# Patient Record
Sex: MALE | Race: WHITE | Employment: OTHER | URBAN - METROPOLITAN AREA
[De-identification: names, ages, dates, MRNs, and addresses within clinical notes are randomized per-mention and may not be internally consistent; named-entity substitution may affect disease eponyms.]

---

## 2018-09-26 ENCOUNTER — OFFICE VISIT (OUTPATIENT)
Dept: DERMATOLOGY CLINIC | Facility: CLINIC | Age: 83
End: 2018-09-26
Payer: MEDICARE

## 2018-09-26 DIAGNOSIS — D23.5 BENIGN NEOPLASM OF SKIN OF TRUNK, EXCEPT SCROTUM: ICD-10-CM

## 2018-09-26 DIAGNOSIS — D23.30 BENIGN NEOPLASM OF SKIN OF FACE: ICD-10-CM

## 2018-09-26 DIAGNOSIS — D23.4 BENIGN NEOPLASM OF SCALP AND SKIN OF NECK: ICD-10-CM

## 2018-09-26 DIAGNOSIS — D23.60 BENIGN NEOPLASM OF SKIN OF UPPER LIMB, INCLUDING SHOULDER, UNSPECIFIED LATERALITY: ICD-10-CM

## 2018-09-26 DIAGNOSIS — L82.1 SEBORRHEIC KERATOSES: Primary | ICD-10-CM

## 2018-09-26 PROCEDURE — G0463 HOSPITAL OUTPT CLINIC VISIT: HCPCS | Performed by: DERMATOLOGY

## 2018-09-26 PROCEDURE — 99213 OFFICE O/P EST LOW 20 MIN: CPT | Performed by: DERMATOLOGY

## 2018-10-07 NOTE — PROGRESS NOTES
Oralia Nunn is a 81year old male. HPI:     CC:  Patient presents with:  Lesion: LOV 9-14-16. Pt here for upper body scan. He wants KMT to check lesions at R neck, R forehead, forearms. He also c/o re-growth of lesion R index finger removed prev. Years of education: Not on file      Highest education level: Not on file    Social Needs      Financial resource strain: Not on file      Food insecurity - worry: Not on file      Food insecurity - inability: Not on file      Transportation needs - medica chest,/ breasts, axillae,  abdomen, arms, legs, palms. Multiple light to medium brown, well marginated, uniformly pigmented, macules and papules 6 mm and less scattered on exam. pigmented lesions examined with dermoscopy benign-appearing patterns. Instructions reviewed at length. Benign nevi, seborrheic  keratoses, cherry angiomas:  Reassurance regarding other benign skin lesions. Signs and symptoms of skin cancer, ABCDE's of melanoma discussed with patient.  Sunscreen use, sun protection, self exa

## 2021-08-20 ENCOUNTER — OFFICE VISIT (OUTPATIENT)
Dept: OTOLARYNGOLOGY | Facility: CLINIC | Age: 86
End: 2021-08-20
Payer: MEDICARE

## 2021-08-20 VITALS — WEIGHT: 168 LBS | BODY MASS INDEX: 27 KG/M2 | HEIGHT: 66 IN

## 2021-08-20 DIAGNOSIS — H90.5 SENSORINEURAL HEARING LOSS (SNHL) OF RIGHT EAR, UNSPECIFIED HEARING STATUS ON CONTRALATERAL SIDE: Primary | ICD-10-CM

## 2021-08-20 DIAGNOSIS — R09.89 BRUIT OF RIGHT CAROTID ARTERY: ICD-10-CM

## 2021-08-20 DIAGNOSIS — H93.11 RIGHT-SIDED TINNITUS: ICD-10-CM

## 2021-08-20 PROCEDURE — 99214 OFFICE O/P EST MOD 30 MIN: CPT | Performed by: SPECIALIST

## 2021-08-20 NOTE — PROGRESS NOTES
Sae Guthrie is a 81year old male. Patient presents with:  Ear Problem: pt is here today for a right clogged ear, it has been this way for about one week     HPI:   Hearing loss in the right ear not associated with dizziness.     Current Outpatient Medica hear.  Georga Corn = air greater than bone bilaterally, however left than right.    Nasal External nose - Normal.   Nasal septum - Normal.  Turbinates - Normal.   Oral/Oropharynx Lips - Normal, Tonsils - Normal, Tongue - Normal    Neck Exam Inspection - Normal. P

## 2021-08-20 NOTE — PATIENT INSTRUCTIONS
Suddenly hearing loss of the right ear without dizziness. Right carotid bruit, this can indicate that there is a narrowing of the artery on the right. Any other stroke symptoms, please go to the emergency room immediately.   I have ordered to test for you

## 2022-03-21 ENCOUNTER — TELEPHONE (OUTPATIENT)
Dept: OTOLARYNGOLOGY | Facility: CLINIC | Age: 87
End: 2022-03-21

## 2022-03-21 NOTE — TELEPHONE ENCOUNTER
Per pt is scheduled for HA on 3/23/22 at [facility], needing order sent.  Please advise

## 2022-03-26 ENCOUNTER — TELEPHONE (OUTPATIENT)
Dept: OTOLARYNGOLOGY | Facility: CLINIC | Age: 87
End: 2022-03-26

## 2022-03-26 NOTE — TELEPHONE ENCOUNTER
Some additional loss on the right versus the left ear. Seems to be getting better. Retest in 1 year, sooner if problems.

## 2023-02-16 ENCOUNTER — TELEPHONE (OUTPATIENT)
Dept: OTOLARYNGOLOGY | Facility: CLINIC | Age: 88
End: 2023-02-16

## 2023-02-16 NOTE — TELEPHONE ENCOUNTER
Pt's wife called. Pt had a hearing test at [facility]. Advised to retest in one year. Can pt get an order or should pt schedule an appointment with dr. Laquita West.  Please call

## 2023-02-24 NOTE — TELEPHONE ENCOUNTER
Yes, this would be best.  Please make her aware that medicare will pay only for 1 audiogram per year so will need to be 1 year or more since last audiogram.

## 2023-02-24 NOTE — TELEPHONE ENCOUNTER
Last audiogram that I see is from 2018. They can get an order from Dr. Enzo Johnson or follow up with me. Whichever they prefer.

## 2023-02-24 NOTE — TELEPHONE ENCOUNTER
Spoke to patient's wife Daniel Bernardo and informed her of Dr. Lawrence Metcalf message. Daniel Bernardo said it is almost a year since his last hearing test. She would like to have this done at the [facility]. Dr. Dany Berg, please review and advise. His LOV was 8/20/21, would you like him to schedule an appointment with you and have his audiogram done at that time. Thank you.

## 2023-04-05 ENCOUNTER — OFFICE VISIT (OUTPATIENT)
Dept: OTOLARYNGOLOGY | Facility: CLINIC | Age: 88
End: 2023-04-05

## 2023-04-05 ENCOUNTER — OFFICE VISIT (OUTPATIENT)
Dept: AUDIOLOGY | Facility: CLINIC | Age: 88
End: 2023-04-05

## 2023-04-05 DIAGNOSIS — H91.90 HEARING LOSS, UNSPECIFIED HEARING LOSS TYPE, UNSPECIFIED LATERALITY: Primary | ICD-10-CM

## 2023-04-05 DIAGNOSIS — H93.13 BILATERAL TINNITUS: ICD-10-CM

## 2023-04-05 DIAGNOSIS — H90.3 SENSORINEURAL HEARING LOSS, BILATERAL: Primary | ICD-10-CM

## 2023-04-05 PROCEDURE — 99213 OFFICE O/P EST LOW 20 MIN: CPT | Performed by: SPECIALIST

## 2023-04-05 PROCEDURE — 92557 COMPREHENSIVE HEARING TEST: CPT | Performed by: AUDIOLOGIST

## 2023-04-05 PROCEDURE — 92567 TYMPANOMETRY: CPT | Performed by: AUDIOLOGIST

## 2023-04-05 RX ORDER — LISINOPRIL 40 MG/1
40 TABLET ORAL DAILY
COMMUNITY
Start: 2023-01-24

## 2023-04-05 RX ORDER — CARVEDILOL 12.5 MG/1
TABLET ORAL
COMMUNITY
Start: 2023-04-03

## 2023-04-05 RX ORDER — CLOTRIMAZOLE AND BETAMETHASONE DIPROPIONATE 10; .64 MG/G; MG/G
1 CREAM TOPICAL 2 TIMES DAILY
COMMUNITY
Start: 2022-11-18

## 2023-04-05 NOTE — PATIENT INSTRUCTIONS
Your audiogram showed bilateral mid to high-frequency moderate to severe hearing loss. Word discrimination was 96% on the right and 88% on the left. You would benefit from hearing aids. Remainder of your head and neck exam was within normal range.

## 2024-02-16 ENCOUNTER — OFFICE VISIT (OUTPATIENT)
Dept: OTOLARYNGOLOGY | Facility: CLINIC | Age: 89
End: 2024-02-16

## 2024-02-16 VITALS — WEIGHT: 170 LBS | BODY MASS INDEX: 27.32 KG/M2 | HEIGHT: 66 IN

## 2024-02-16 DIAGNOSIS — R04.0 EPISTAXIS: ICD-10-CM

## 2024-02-16 DIAGNOSIS — H90.5 SENSORINEURAL HEARING LOSS (SNHL) OF RIGHT EAR, UNSPECIFIED HEARING STATUS ON CONTRALATERAL SIDE: Primary | ICD-10-CM

## 2024-02-17 NOTE — PATIENT INSTRUCTIONS
Your right nasal septum was fully cauterized.  Avoid nose blowing for 1 week's time.  Follow-up in April or May for a repeat audiogram.

## 2024-02-17 NOTE — PROGRESS NOTES
Benny Avery is a 88 year old male.   Chief Complaint   Patient presents with    Epistaxis     Frequent nosebleeds    Follow - Up     hearing loss, unspecified hearing loss type, unspecified laterality     HPI:   Patient here for right epistaxis    Current Outpatient Medications   Medication Sig Dispense Refill    carvedilol 12.5 MG Oral Tab       Alfuzosin HCl ER (UROXATRAL) 10 MG Oral Tablet 24 Hr Take  by mouth.      aspirin EC 81 MG Oral Tab EC Take  by mouth.      AmLODIPine Besylate (NORVASC) 5 MG Oral Tab   3    clotrimazole-betamethasone 1-0.05 % External Cream Apply 1 Application topically 2 (two) times daily. (Patient not taking: Reported on 4/5/2023)      lisinopril 40 MG Oral Tab Take 1 tablet (40 mg total) by mouth daily. (Patient not taking: Reported on 2/16/2024)      Desoximetasone 0.05 % External Gel Use bid prn rash on legs (Patient not taking: Reported on 4/5/2023) 15 g 3    lisinopril (PRINIVIL,ZESTRIL) 20 MG Oral Tab 2 tablets (40 mg total). (Patient not taking: Reported on 4/5/2023)  2    Pravastatin Sodium (PRAVACHOL) 80 MG Oral Tab Take  by mouth. (Patient not taking: Reported on 4/5/2023)        Past Medical History:   Diagnosis Date    Cataract     Hemorrhoids     History of chicken pox     per NG    History of measles     per     Other and unspecified hyperlipidemia     Unspecified essential hypertension     Wears glasses       Social History:  Social History     Socioeconomic History    Marital status:    Tobacco Use    Smoking status: Never    Smokeless tobacco: Never   Vaping Use    Vaping Use: Never used   Substance and Sexual Activity    Alcohol use: Yes     Comment: occasionally    Drug use: Never   Other Topics Concern    Pt has a pacemaker No    Pt has a defibrillator No    Reaction to local anesthetic No        REVIEW OF SYSTEMS:   GENERAL HEALTH: feels well otherwise  GENERAL : denies fever, chills, sweats, weight loss, weight gain  SKIN: denies any unusual skin lesions  or rashes  RESPIRATORY: denies shortness of breath with exertion  NEURO: denies headaches    EXAM:   Ht 5' 6\" (1.676 m)   Wt 170 lb (77.1 kg)   BMI 27.44 kg/m²   System Details   Skin Inspection - Normal.   Constitutional Overall appearance - Normal.   Head/Face Facial features - Normal. Eyebrows - Normal. Skull - Normal.   Eyes Conjunctiva - Right: Normal, Left: Normal. Pupil - Right: Normal, Left: Normal.    Ears Inspection - Right: Normal, Left: Normal.   Canal - Right: Normal, Left: Normal.   TM - Right: Normal, Left: Normal.   Nasal External nose - Normal.   Nasal septum -small clot and prominent septal vessels noted anteriorly inferiorly on the right.  Consent was obtained.  Area was anesthetized with César-Synephrine and lidocaine.  Area was cauterized with silver nitrate.  Neosporin ointment was placed.  Turbinates - Normal.   Oral/Oropharynx Lips - Normal, Tonsils - Normal, Tongue - Normal    Neck Exam Inspection - Normal. Palpation - Normal. Parotid gland - Normal. Thyroid gland - Normal.   Lymph Detail Submental. Submandibular. Anterior cervical. Posterior cervical. Supraclavicular all without enlargement   Psychiatric Orientation - Oriented to time, place, person & situation. Appropriate mood and affect.   Neurological Memory - Normal. Cranial nerves - Cranial nerves II through XII grossly intact.     ASSESSMENT AND PLAN:   1. 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  Very slight asymmetric hearing loss right worse than left.  Follow-up around that time.    2. Epistaxis  Cauterized as above.  No nose blowing for 1 week's time.        The patient indicates understanding of these issues and agrees to the plan.      Mayela Gunn MD  2/16/2024  9:10 PM

## 2024-03-29 ENCOUNTER — APPOINTMENT (OUTPATIENT)
Dept: URBAN - METROPOLITAN AREA CLINIC 244 | Age: 89
Setting detail: DERMATOLOGY
End: 2024-03-29

## 2024-03-29 DIAGNOSIS — L57.0 ACTINIC KERATOSIS: ICD-10-CM

## 2024-03-29 DIAGNOSIS — L20.89 OTHER ATOPIC DERMATITIS: ICD-10-CM

## 2024-03-29 DIAGNOSIS — D22 MELANOCYTIC NEVI: ICD-10-CM

## 2024-03-29 DIAGNOSIS — L81.4 OTHER MELANIN HYPERPIGMENTATION: ICD-10-CM

## 2024-03-29 DIAGNOSIS — L82.1 OTHER SEBORRHEIC KERATOSIS: ICD-10-CM

## 2024-03-29 PROBLEM — D22.39 MELANOCYTIC NEVI OF OTHER PARTS OF FACE: Status: ACTIVE | Noted: 2024-03-29

## 2024-03-29 PROBLEM — D22.62 MELANOCYTIC NEVI OF LEFT UPPER LIMB, INCLUDING SHOULDER: Status: ACTIVE | Noted: 2024-03-29

## 2024-03-29 PROBLEM — D22.61 MELANOCYTIC NEVI OF RIGHT UPPER LIMB, INCLUDING SHOULDER: Status: ACTIVE | Noted: 2024-03-29

## 2024-03-29 PROCEDURE — 17003 DESTRUCT PREMALG LES 2-14: CPT

## 2024-03-29 PROCEDURE — OTHER LIQUID NITROGEN: OTHER

## 2024-03-29 PROCEDURE — 99203 OFFICE O/P NEW LOW 30 MIN: CPT | Mod: 25

## 2024-03-29 PROCEDURE — OTHER COUNSELING: OTHER

## 2024-03-29 PROCEDURE — OTHER PRESCRIPTION: OTHER

## 2024-03-29 PROCEDURE — 17000 DESTRUCT PREMALG LESION: CPT

## 2024-03-29 RX ORDER — TRIAMCINOLONE ACETONIDE 1 MG/G
OINTMENT TOPICAL
Qty: 80 | Refills: 0 | Status: ERX | COMMUNITY
Start: 2024-03-29

## 2024-03-29 ASSESSMENT — LOCATION DETAILED DESCRIPTION DERM
LOCATION DETAILED: RIGHT INFERIOR CENTRAL MALAR CHEEK
LOCATION DETAILED: LEFT RADIAL DORSAL HAND
LOCATION DETAILED: GLABELLA
LOCATION DETAILED: LEFT DORSAL WRIST
LOCATION DETAILED: LEFT DISTAL ULNAR DORSAL FOREARM
LOCATION DETAILED: RIGHT DORSAL WRIST
LOCATION DETAILED: RIGHT DORSAL FOOT
LOCATION DETAILED: RIGHT RADIAL DORSAL HAND
LOCATION DETAILED: LEFT INFERIOR CENTRAL MALAR CHEEK
LOCATION DETAILED: LEFT ULNAR DORSAL HAND

## 2024-03-29 ASSESSMENT — LOCATION SIMPLE DESCRIPTION DERM
LOCATION SIMPLE: RIGHT CHEEK
LOCATION SIMPLE: RIGHT WRIST
LOCATION SIMPLE: LEFT HAND
LOCATION SIMPLE: RIGHT FOOT
LOCATION SIMPLE: LEFT FOREARM
LOCATION SIMPLE: RIGHT HAND
LOCATION SIMPLE: LEFT WRIST
LOCATION SIMPLE: LEFT CHEEK
LOCATION SIMPLE: GLABELLA

## 2024-03-29 ASSESSMENT — LOCATION ZONE DERM
LOCATION ZONE: FACE
LOCATION ZONE: HAND
LOCATION ZONE: ARM
LOCATION ZONE: FEET

## 2024-04-05 ENCOUNTER — OFFICE VISIT (OUTPATIENT)
Dept: OTOLARYNGOLOGY | Facility: CLINIC | Age: 89
End: 2024-04-05

## 2024-04-05 VITALS — BODY MASS INDEX: 27.32 KG/M2 | HEIGHT: 66 IN | WEIGHT: 170 LBS

## 2024-04-05 DIAGNOSIS — R04.0 EPISTAXIS: Primary | ICD-10-CM

## 2024-04-05 RX ORDER — TRIAMCINOLONE ACETONIDE 1 MG/G
OINTMENT TOPICAL
COMMUNITY
Start: 2024-03-29

## 2024-04-05 NOTE — PATIENT INSTRUCTIONS
Your right septal vessel was once again cauterized.  Apply Vaseline to the area.  Follow-up with any additional questions or problems.

## 2025-03-11 ENCOUNTER — OFFICE VISIT (OUTPATIENT)
Dept: OTOLARYNGOLOGY | Facility: CLINIC | Age: 89
End: 2025-03-11

## 2025-03-11 VITALS — BODY MASS INDEX: 27 KG/M2 | WEIGHT: 170 LBS

## 2025-03-11 DIAGNOSIS — R04.0 EPISTAXIS: Primary | ICD-10-CM

## 2025-03-11 PROCEDURE — 30901 CONTROL OF NOSEBLEED: CPT | Performed by: SPECIALIST

## 2025-03-11 PROCEDURE — 99212 OFFICE O/P EST SF 10 MIN: CPT | Performed by: SPECIALIST

## 2025-03-11 NOTE — PATIENT INSTRUCTIONS
Your right nasal septal vessels were cauterized.  No nose blowing for 1 week's time.  Follow-up with any additional questions or problems.

## 2025-03-11 NOTE — PROGRESS NOTES
Benny Avery is a 89 year old male.   Chief Complaint   Patient presents with    Epistaxis     Patient is here for frequent nose bleeds last nose bleed was on Sunday from right nostril      HPI:   Patient here with recurrent right epistaxis.  Problem happened after he had a cold.    Current Outpatient Medications   Medication Sig Dispense Refill    carvedilol 12.5 MG Oral Tab       Alfuzosin HCl ER (UROXATRAL) 10 MG Oral Tablet 24 Hr Take  by mouth.      aspirin EC 81 MG Oral Tab EC Take  by mouth.      AmLODIPine Besylate (NORVASC) 5 MG Oral Tab   3    triamcinolone 0.1 % External Ointment APPLY TO RASH ON TO THE AFFECTED AREA TWICE DAILY FOR 2 WEEKS MAX PER FLARE (Patient not taking: Reported on 3/11/2025)      clotrimazole-betamethasone 1-0.05 % External Cream Apply 1 Application topically 2 (two) times daily. (Patient not taking: Reported on 3/11/2025)      lisinopril 40 MG Oral Tab Take 1 tablet (40 mg total) by mouth daily. (Patient not taking: Reported on 3/11/2025)      Desoximetasone 0.05 % External Gel Use bid prn rash on legs (Patient not taking: Reported on 3/11/2025) 15 g 3    lisinopril (PRINIVIL,ZESTRIL) 20 MG Oral Tab 2 tablets (40 mg total). (Patient not taking: Reported on 3/11/2025)  2    Pravastatin Sodium (PRAVACHOL) 80 MG Oral Tab Take  by mouth. (Patient not taking: Reported on 3/11/2025)        Past Medical History:    Cataract    Hemorrhoids    History of chicken pox    per NG    History of measles    per NG    Other and unspecified hyperlipidemia    Unspecified essential hypertension    Wears glasses      Social History:  Social History     Socioeconomic History    Marital status:    Tobacco Use    Smoking status: Never    Smokeless tobacco: Never   Vaping Use    Vaping status: Never Used   Substance and Sexual Activity    Alcohol use: Yes     Comment: occasionally    Drug use: Never   Other Topics Concern    Pt has a pacemaker No    Pt has a defibrillator No    Reaction to local  anesthetic No     Social Drivers of Health     Food Insecurity: No Food Insecurity (2/7/2025)    Received from [facility]    Hunger Vital Sign     Worried About Running Out of Food in the Last Year: Never true     Ran Out of Food in the Last Year: Never true   Transportation Needs: No Transportation Needs (2/7/2025)    Received from [facility]    PRAPARE - Transportation     Lack of Transportation (Medical): No     Lack of Transportation (Non-Medical): No   Stress: No Stress Concern Present (1/16/2020)    [facility], [facility]    [place] [employer] - Occupational Stress Questionnaire     Feeling of Stress : Not at all   Housing Stability: Low Risk  (2/7/2025)    Received from [facility]    Housing Stability Vital Sign     Unable to Pay for Housing in the Last Year: No     Number of Times Moved in the Last Year: 1     Homeless in the Last Year: No        REVIEW OF SYSTEMS:   GENERAL HEALTH: feels well otherwise  GENERAL : denies fever, chills, sweats, weight loss, weight gain  SKIN: denies any unusual skin lesions or rashes  RESPIRATORY: denies shortness of breath with exertion  NEURO: denies headaches    EXAM:   Wt 170 lb (77.1 kg)   BMI 27.44 kg/m²   System Details   Skin Inspection - Normal.   Constitutional Overall appearance - Normal.   Head/Face Facial features - Normal. Eyebrows - Normal. Skull - Normal.   Eyes Conjunctiva - Right: Normal, Left: Normal. Pupil - Right: Normal, Left: Normal.    Ears Inspection - Right: Normal, Left: Normal.   Canal - Right: Normal, Left: Normal.   TM - Right: Normal, Left: Normal.   Nasal External nose - Normal.   Nasal septum -right anterior septal vessels identified.  Area was anesthetized with César-Synephrine and lidocaine.  A silver nitrate stick was used to cauterize the area.  Neosporin ointment was placed.  No  complications.  Turbinates - Normal.   Oral/Oropharynx Lips - Normal, Tonsils - Normal, Tongue - Normal    Neck Exam Inspection - Normal. Palpation - Normal. Parotid gland - Normal. Thyroid gland - Normal.   Lymph Detail Submental. Submandibular. Anterior cervical. Posterior cervical. Supraclavicular all without enlargement   Psychiatric Orientation - Oriented to time, place, person & situation. Appropriate mood and affect.   Neurological Memory - Normal. Cranial nerves - Cranial nerves II through XII grossly intact.     ASSESSMENT AND PLAN:   1. Epistaxis  Cauterized as above.  Patient to avoid nose blowing for 1 week's time.  Follow-up with any additional questions or problems.      The patient indicates understanding of these issues and agrees to the plan.      Mayela Gunn MD  3/11/2025  6:52 PM

## 2025-04-14 ENCOUNTER — TELEPHONE (OUTPATIENT)
Dept: OTOLARYNGOLOGY | Facility: CLINIC | Age: 89
End: 2025-04-14

## 2025-04-14 NOTE — TELEPHONE ENCOUNTER
Per patient, had a nosebleed this morning, dripping steadily, held pressure to soft fleshy part of nose for 30 minutes before it stopped, blood started to drip down throat when holding the pressure, went over all nosebleed instructions with patient, aware he is to proceed immediately to ER if nosebleed reoccurs and he can not stop it after 15 minutes.   Takes baby aspirin daily.    Dr. Gunn, please see above and advise.   Would like to know if he can be seen for an exam?

## 2025-04-14 NOTE — TELEPHONE ENCOUNTER
Per patient's wife calling stating patient nose was bleeding profusely for half an hour before patient was able to stop it. Per patient is asking if Dr. Gnun would be able to see him anytime sooner for a checkup.

## 2025-04-14 NOTE — TELEPHONE ENCOUNTER
Per Dr. Gunn to see pt at 1200pm tomorrow at [place], pt agreeable with no further questions or concerns.

## 2025-04-15 ENCOUNTER — OFFICE VISIT (OUTPATIENT)
Dept: OTOLARYNGOLOGY | Facility: CLINIC | Age: 89
End: 2025-04-15

## 2025-04-15 DIAGNOSIS — R04.0 EPISTAXIS: Primary | ICD-10-CM

## 2025-04-15 PROCEDURE — 99213 OFFICE O/P EST LOW 20 MIN: CPT | Performed by: SPECIALIST

## 2025-04-15 PROCEDURE — 30903 CONTROL OF NOSEBLEED: CPT | Performed by: SPECIALIST

## 2025-04-16 NOTE — PROGRESS NOTES
Benny Avery is a 89 year old male.   Chief Complaint   Patient presents with    Nose Problem     Patient Presents with: right nostril nose bleed, not currently bleeding      HPI:   Patient here with recurrent right epistaxis.  Was last cauterized on 3/11/2025.  Bleeding is always from the right.    Current Medications[1]   Past Medical History[2]   Social History:  Short Social Hx on File[3]     REVIEW OF SYSTEMS:   GENERAL HEALTH: feels well otherwise  GENERAL : denies fever, chills, sweats, weight loss, weight gain  SKIN: denies any unusual skin lesions or rashes  RESPIRATORY: denies shortness of breath with exertion  NEURO: denies headaches    EXAM:   There were no vitals taken for this visit.  System Details   Skin Inspection - Normal.   Constitutional Overall appearance - Normal.   Head/Face Facial features - Normal. Eyebrows - Normal. Skull - Normal.   Eyes Conjunctiva - Right: Normal, Left: Normal. Pupil - Right: Normal, Left: Normal.    Ears Inspection - Right: Normal, Left: Normal.   Canal - Right: Normal, Left: Normal.   TM - Right: Normal, Left: Normal.   Nasal External nose - Normal.   Nasal septum -the area on the anterior aspect of the inferior turbinate as well as 1 area on the inferior aspect of the mid vocal cord were identified with prominent vessels.  No bleeding or clots were noted.  The area was anesthetized with César-Synephrine and lidocaine.  A silver nitrate stick was used to cauterize both areas.  A Heema pore pack was placed.  No complications.  Turbinates - Normal.   Oral/Oropharynx Lips - Normal, Tonsils - Normal, Tongue - Normal    Neck Exam Inspection - Normal. Palpation - Normal. Parotid gland - Normal. Thyroid gland - Normal.   Lymph Detail Submental. Submandibular. Anterior cervical. Posterior cervical. Supraclavicular all without enlargement   Psychiatric Orientation - Oriented to time, place, person & situation. Appropriate mood and affect.   Neurological Memory - Normal. Cranial  nerves - Cranial nerves II through XII grossly intact.     ASSESSMENT AND PLAN:   1. Epistaxis  Cauterized and packed as above.  Patient to follow-up in 2 weeks time, sooner if problems.  If bleeding is persistent a fiberoptic scope will be recommended on his follow-up visit.      The patient indicates understanding of these issues and agrees to the plan.      Mayela Gunn MD  4/15/2025  7:20 PM       [1]   Current Outpatient Medications   Medication Sig Dispense Refill    triamcinolone 0.1 % External Ointment APPLY TO RASH ON TO THE AFFECTED AREA TWICE DAILY FOR 2 WEEKS MAX PER FLARE      clotrimazole-betamethasone 1-0.05 % External Cream Apply 1 Application topically 2 (two) times daily.      lisinopril 40 MG Oral Tab Take 1 tablet (40 mg total) by mouth daily.      carvedilol 12.5 MG Oral Tab       Desoximetasone 0.05 % External Gel Use bid prn rash on legs 15 g 3    Alfuzosin HCl ER (UROXATRAL) 10 MG Oral Tablet 24 Hr Take  by mouth.      aspirin EC 81 MG Oral Tab EC Take  by mouth.      AmLODIPine Besylate (NORVASC) 5 MG Oral Tab   3    lisinopril (PRINIVIL,ZESTRIL) 20 MG Oral Tab 2 tablets (40 mg total).  2    Pravastatin Sodium (PRAVACHOL) 80 MG Oral Tab Take  by mouth.     [2]   Past Medical History:   Cataract    Hemorrhoids    History of chicken pox    per NG    History of measles    per NG    Other and unspecified hyperlipidemia    Unspecified essential hypertension    Wears glasses   [3]   Social History  Socioeconomic History    Marital status:    Tobacco Use    Smoking status: Never    Smokeless tobacco: Never   Vaping Use    Vaping status: Never Used   Substance and Sexual Activity    Alcohol use: Yes     Comment: occasionally    Drug use: Never   Other Topics Concern    Pt has a pacemaker No    Pt has a defibrillator No    Reaction to local anesthetic No     Social Drivers of Health     Food Insecurity: No Food Insecurity (2/7/2025)    Received from [facility]     Hunger Vital Sign     Worried About Running Out of Food in the Last Year: Never true     Ran Out of Food in the Last Year: Never true   Transportation Needs: No Transportation Needs (2/7/2025)    Received from [facility]    PRAPARE - Transportation     Lack of Transportation (Medical): No     Lack of Transportation (Non-Medical): No   Stress: No Stress Concern Present (1/16/2020)    Received from [facility]    [place] [employer] - Occupational Stress Questionnaire     Feeling of Stress : Not at all   Housing Stability: Low Risk  (2/7/2025)    Received from [facility]    Housing Stability Vital Sign     Unable to Pay for Housing in the Last Year: No     Number of Times Moved in the Last Year: 1     Homeless in the Last Year: No

## 2025-04-16 NOTE — PATIENT INSTRUCTIONS
1 area on the anterior aspect of your inferior turbinate and 1 area on your mid septum were fully cauterized.  A Heema pore pack was also placed.  Follow-up in 2 weeks time, sooner if problems.  If there is any further bleeding a fiberoptic scope will be recommended.

## 2025-04-24 NOTE — PROGRESS NOTES
Benny Avery is a 88 year old male.   Chief Complaint   Patient presents with    Epistaxis     Nosebleeds      HPI:   Patient here with epistaxis.  Was worse on Tuesday.    Current Outpatient Medications   Medication Sig Dispense Refill    triamcinolone 0.1 % External Ointment APPLY TO RASH ON TO THE AFFECTED AREA TWICE DAILY FOR 2 WEEKS MAX PER FLARE      carvedilol 12.5 MG Oral Tab       Alfuzosin HCl ER (UROXATRAL) 10 MG Oral Tablet 24 Hr Take  by mouth.      aspirin EC 81 MG Oral Tab EC Take  by mouth.      AmLODIPine Besylate (NORVASC) 5 MG Oral Tab   3    clotrimazole-betamethasone 1-0.05 % External Cream Apply 1 Application topically 2 (two) times daily. (Patient not taking: Reported on 4/5/2023)      lisinopril 40 MG Oral Tab Take 1 tablet (40 mg total) by mouth daily. (Patient not taking: Reported on 2/16/2024)      Desoximetasone 0.05 % External Gel Use bid prn rash on legs (Patient not taking: Reported on 4/5/2023) 15 g 3    lisinopril (PRINIVIL,ZESTRIL) 20 MG Oral Tab 2 tablets (40 mg total). (Patient not taking: Reported on 4/5/2023)  2    Pravastatin Sodium (PRAVACHOL) 80 MG Oral Tab Take  by mouth. (Patient not taking: Reported on 4/5/2023)        Past Medical History:   Diagnosis Date    Cataract     Hemorrhoids     History of chicken pox     per NG    History of measles     per     Other and unspecified hyperlipidemia     Unspecified essential hypertension     Wears glasses       Social History:  Social History     Socioeconomic History    Marital status:    Tobacco Use    Smoking status: Never    Smokeless tobacco: Never   Vaping Use    Vaping Use: Never used   Substance and Sexual Activity    Alcohol use: Yes     Comment: occasionally    Drug use: Never   Other Topics Concern    Pt has a pacemaker No    Pt has a defibrillator No    Reaction to local anesthetic No        REVIEW OF SYSTEMS:   GENERAL HEALTH: feels well otherwise  GENERAL : denies fever, chills, sweats, weight loss, weight  gain  SKIN: denies any unusual skin lesions or rashes  RESPIRATORY: denies shortness of breath with exertion  NEURO: denies headaches    EXAM:   Ht 5' 6\" (1.676 m)   Wt 170 lb (77.1 kg)   BMI 27.44 kg/m²   System Details   Skin Inspection - Normal.   Constitutional Overall appearance - Normal.   Head/Face Facial features - Normal. Eyebrows - Normal. Skull - Normal.   Eyes Conjunctiva - Right: Normal, Left: Normal. Pupil - Right: Normal, Left: Normal.    Ears Inspection - Right: Normal, Left: Normal.   Canal - Right: Normal, Left: Normal.   TM - Right: Normal, Left: Normal.   Nasal External nose - Normal.   Nasal septum -consent was obtained.  Area was anesthetized with César-Synephrine and lidocaine.  A silver nitrate stick was used to cauterize the anterior septum.  Neosporin ointment was placed.  No complications.  Turbinates - Normal.   Oral/Oropharynx Lips - Normal, Tonsils - Normal, Tongue - Normal    Neck Exam Inspection - Normal. Palpation - Normal. Parotid gland - Normal. Thyroid gland - Normal.   Lymph Detail Submental. Submandibular. Anterior cervical. Posterior cervical. Supraclavicular all without enlargement   Psychiatric Orientation - Oriented to time, place, person & situation. Appropriate mood and affect.   Neurological Memory - Normal. Cranial nerves - Cranial nerves II through XII grossly intact.     ASSESSMENT AND PLAN:   1. Epistaxis  Cauterized as above.  Patient to avoid nose blowing for 1 week's time.  Demonstrated how to pinch the nose should there be further bleeding.  Can apply Neosporin ointment or Vaseline on an as needed basis.  Follow-up with any additional questions or problems.      The patient indicates understanding of these issues and agrees to the plan.      Mayela Gunn MD  4/5/2024  1:01 PM   63

## 2025-05-02 ENCOUNTER — OFFICE VISIT (OUTPATIENT)
Dept: OTOLARYNGOLOGY | Facility: CLINIC | Age: 89
End: 2025-05-02

## 2025-05-02 DIAGNOSIS — R04.0 EPISTAXIS: Primary | ICD-10-CM

## 2025-05-02 PROCEDURE — 99213 OFFICE O/P EST LOW 20 MIN: CPT | Performed by: SPECIALIST

## 2025-05-03 NOTE — PATIENT INSTRUCTIONS
Both areas that were cauterized on the nasal septum and anterior aspect of the inferior turbinate are healed.  There is no evidence of bleeding or clots.  Follow-up with any additional questions or problems.

## 2025-05-03 NOTE — PROGRESS NOTES
Benny Avery is a 89 year old male.   Chief Complaint   Patient presents with    Follow - Up     Patient is here due to nosebleed      HPI:   Patient here for follow-up of his epistaxis.  He had cauterization of the right inferior turbinate as well as nasal septum.  Both on the right.  No further bleeding.    Current Medications[1]   Past Medical History[2]   Social History:  Short Social Hx on File[3]     REVIEW OF SYSTEMS:   GENERAL HEALTH: feels well otherwise  GENERAL : denies fever, chills, sweats, weight loss, weight gain  SKIN: denies any unusual skin lesions or rashes  RESPIRATORY: denies shortness of breath with exertion  NEURO: denies headaches    EXAM:   There were no vitals taken for this visit.  System Details   Skin Inspection - Normal.   Constitutional Overall appearance - Normal.   Head/Face Facial features - Normal. Eyebrows - Normal. Skull - Normal.   Eyes Conjunctiva - Right: Normal, Left: Normal. Pupil - Right: Normal, Left: Normal.    Ears Inspection - Right: Normal, Left: Normal.   Canal - Right: Normal, Left: Normal.   TM - Right: Normal, Left: Normal.   Nasal External nose - Normal.   Nasal septum -no bleeding or clots on the septum  Turbinates -slight crusting on the anterior aspect of the right inferior turbinate but no bleeding or clots noted.   Oral/Oropharynx Lips - Normal, Tonsils - Normal, Tongue - Normal    Neck Exam Inspection - Normal. Palpation - Normal. Parotid gland - Normal. Thyroid gland - Normal.   Lymph Detail Submental. Submandibular. Anterior cervical. Posterior cervical. Supraclavicular all without enlargement   Psychiatric Orientation - Oriented to time, place, person & situation. Appropriate mood and affect.   Neurological Memory - Normal. Cranial nerves - Cranial nerves II through XII grossly intact.     ASSESSMENT AND PLAN:   1. Epistaxis  No further epistaxis after cauterization.  Follow-up with any additional questions or problems.        The patient indicates  understanding of these issues and agrees to the plan.      Mayela Gunn MD  5/2/2025  9:28 PM       [1]   Current Outpatient Medications   Medication Sig Dispense Refill    triamcinolone 0.1 % External Ointment APPLY TO RASH ON TO THE AFFECTED AREA TWICE DAILY FOR 2 WEEKS MAX PER FLARE      clotrimazole-betamethasone 1-0.05 % External Cream Apply 1 Application topically 2 (two) times daily.      lisinopril 40 MG Oral Tab Take 1 tablet (40 mg total) by mouth daily.      carvedilol 12.5 MG Oral Tab       Desoximetasone 0.05 % External Gel Use bid prn rash on legs 15 g 3    Alfuzosin HCl ER (UROXATRAL) 10 MG Oral Tablet 24 Hr Take  by mouth.      aspirin EC 81 MG Oral Tab EC Take  by mouth.      AmLODIPine Besylate (NORVASC) 5 MG Oral Tab   3    lisinopril (PRINIVIL,ZESTRIL) 20 MG Oral Tab 2 tablets (40 mg total).  2    Pravastatin Sodium (PRAVACHOL) 80 MG Oral Tab Take  by mouth.     [2]   Past Medical History:   Cataract    Hemorrhoids    History of chicken pox    per NG    History of measles    per     Other and unspecified hyperlipidemia    Unspecified essential hypertension    Wears glasses   [3]   Social History  Socioeconomic History    Marital status:    Tobacco Use    Smoking status: Never    Smokeless tobacco: Never   Vaping Use    Vaping status: Never Used   Substance and Sexual Activity    Alcohol use: Yes     Comment: occasionally    Drug use: Never   Other Topics Concern    Pt has a pacemaker No    Pt has a defibrillator No    Reaction to local anesthetic No     Social Drivers of Health     Food Insecurity: No Food Insecurity (2/7/2025)    Received from [facility]    Hunger Vital Sign     Worried About Running Out of Food in the Last Year: Never true     Ran Out of Food in the Last Year: Never true   Transportation Needs: No Transportation Needs (2/7/2025)    Received from [facility]    PRAPARE - Transportation     Lack of Transportation (Medical):  No     Lack of Transportation (Non-Medical): No   Stress: No Stress Concern Present (1/16/2020)    Received from [facility]    [employer] - Occupational Stress Questionnaire     Feeling of Stress : Not at all   Housing Stability: Low Risk  (2/7/2025)    Received from [facility]    Housing Stability Vital Sign     Unable to Pay for Housing in the Last Year: No     Number of Times Moved in the Last Year: 1     Homeless in the Last Year: No

## (undated) DIAGNOSIS — H90.5 SENSORINEURAL HEARING LOSS (SNHL) OF RIGHT EAR, UNSPECIFIED HEARING STATUS ON CONTRALATERAL SIDE: Primary | ICD-10-CM